# Patient Record
(demographics unavailable — no encounter records)

---

## 2024-11-20 NOTE — PHYSICAL EXAM
[FreeTextEntry1] : GENERAL: alert, cooperative, in NAD SKIN: The skin is intact, warm, pink and dry over the area examined. EYES: Normal conjunctiva, normal eyelids and pupils were equal and round. ENT: normal ears, normal nose and normal lips. CARDIOVASCULAR: brisk capillary refill, but no peripheral edema. RESPIRATORY: The patient is in no apparent respiratory distress. They're taking full deep breaths without use of accessory muscles or evidence of audible wheezes or stridor without the use of a stethoscope. Normal respiratory effort. ABDOMEN: not examined  MSK: No obvious abnormalities in the upper and lower extremities. Full ROM of the wrists, elbows, shoulders, ankles, knees, and hips. Full ROM without tenderness to the neck.  Spine: Back examination reveals that the patient is well centered with head and shoulders aligned with the pelvis. L shoulder and scapula mildly elevated as compared to the R. Field forward bend test does not reveal significant paraspinal prominence.  Minimal tenderness to palpation over the coccyx region. No tenderness along paraspinal musculature. Walks with coordination and balance. Able to squat, jump, heel and toe walk without difficulty. Full active ROM of the back with flexion, extension, rotation, and lateral bending without discomfort or stiffness.  5/5 muscle strength. Patellar and achilles reflexes are +2 B/L. No clonus or babinski. Superficial abdominal reflexes are present and symmetric.

## 2024-11-20 NOTE — REVIEW OF SYSTEMS
[Back Pain] : ~T back pain [Appropriate Age Development] : development appropriate for age [Change in Activity] : no change in activity [Fever Above 102] : no fever [Malaise] : no malaise [Rash] : no rash [Eye Pain] : no eye pain [Redness] : no redness [Nasal Stuffiness] : no nasal congestion [Sore Throat] : no sore throat [Heart Problems] : no heart problems [Tachypnea] : no tachypnea [Wheezing] : no wheezing [Vomiting] : no vomiting [Limping] : no limping [Joint Pains] : no arthralgias [Joint Swelling] : no joint swelling

## 2024-11-20 NOTE — REASON FOR VISIT
[Initial Evaluation] : an initial evaluation [Patient] : patient [Mother] : mother [FreeTextEntry1] : coccyx pain, scoliosis eval

## 2024-11-20 NOTE — DATA REVIEWED
[de-identified] : Scoliosis Xrays AP and lateral were ordered, done and then independently reviewed today 11/20/2024. No acute fractures noted. Curvature of the spine measuring less than 10 degrees. Risser 4. Normal kyphosis and lordosis on lateral films. No spondylolisthesis or spondylosis noted. Disc spaces equal throughout the spine.

## 2024-11-20 NOTE — DATA REVIEWED
[de-identified] : Scoliosis Xrays AP and lateral were ordered, done and then independently reviewed today 11/20/2024. No acute fractures noted. Curvature of the spine measuring less than 10 degrees. Risser 4. Normal kyphosis and lordosis on lateral films. No spondylolisthesis or spondylosis noted. Disc spaces equal throughout the spine.

## 2024-11-20 NOTE — ASSESSMENT
[FreeTextEntry1] : Zuly is a 15 year old with an injury of the coccyx sustained 11/17/24 and spinal asymmetry.   Today's assessment was performed with the assistance of the patient's parent as an independent historian given the patient's age, who could not be considered a reliable history/due to the nonverbal nature given the patient's young age. Clinical findings and x-ray results were reviewed at length with the patient and parent. The condition, natural history, and prognosis were explained to the patient and family. Scoliosis Xrays AP and lateral were ordered, done and then independently reviewed today 11/20/2024. No acute fractures noted. Curvature of the spine measuring less than 10 degrees. Risser 4. Normal kyphosis and lordosis on lateral films. No spondylolisthesis or spondylosis noted. Disc spaces equal throughout the spine. At this time, there is no orthopedic intervention necessary in regards to her coccyx injury. She can continue resting from activities for another 1 week and then can gradually return to activities as tolerated. In regards to her spinal asymmetry, the natural history of scoliosis discussed at length. No orthopedic interventions needed at this time. We will continue with observation. Patient has some remaining spinal growth. The curve has potential to progress with time and growth. I am recommending follow up in 6 months. If the curve increases to 25 or 30 degrees, I will recommend bracing at that time. Scoliosis PA full spine x-rays will be done at follow up appointment.  All questions and concerns were addressed today. Parent and patient verbalize understanding and agree with plan of care.  I, Clarisa Monson PA-C, have acted as a scribe and documented the above information for Dr. Ortiz.

## 2024-11-20 NOTE — HISTORY OF PRESENT ILLNESS
[FreeTextEntry1] : Zuly is a 15 year old, otherwise healthy, female presenting to the office today with her mother for initial pediatric orthopedic evaluation of coccyx pain and scoliosis evaluation. Patient slipped and fell down the stairs on Sunday, now 3 days ago, and landed onto her tailbone. She had immediate pain about the area after the fall. She was seen by the pediatrician who obtained x-rays revealing no acute fractures. However, they did note a mild scoliosis of the lumbar spine and recommended further orthopedic evaluation of these issues. Today, she is overall doing well. She reports improvement in her discomfort about the tailbone. She has been icing the area as needed for pain relief and resting from activities. Additionally, she denies any significant back pain or discomfort. She is able to participate in activities without restrictions or limitations. There is no family history of scoliosis. Patient denies symptoms of numbness, tingling, or weakness to the LE, radiating lower extremity pain, or bladder/ bowel dysfunction. She is one year post-menarchal.